# Patient Record
Sex: FEMALE | Race: WHITE | NOT HISPANIC OR LATINO | Employment: OTHER | RURAL
[De-identification: names, ages, dates, MRNs, and addresses within clinical notes are randomized per-mention and may not be internally consistent; named-entity substitution may affect disease eponyms.]

---

## 2017-02-18 ENCOUNTER — OFFICE VISIT (OUTPATIENT)
Dept: RETAIL CLINIC | Facility: CLINIC | Age: 32
End: 2017-02-18

## 2017-02-18 VITALS
HEART RATE: 85 BPM | BODY MASS INDEX: 30.92 KG/M2 | TEMPERATURE: 98.7 F | RESPIRATION RATE: 17 BRPM | WEIGHT: 216 LBS | OXYGEN SATURATION: 98 % | HEIGHT: 70 IN

## 2017-02-18 DIAGNOSIS — H02.9 EYELID ABNORMALITY: ICD-10-CM

## 2017-02-18 DIAGNOSIS — J06.9 UPPER RESPIRATORY TRACT INFECTION, UNSPECIFIED TYPE: Primary | ICD-10-CM

## 2017-02-18 PROCEDURE — 99213 OFFICE O/P EST LOW 20 MIN: CPT | Performed by: NURSE PRACTITIONER

## 2017-02-18 RX ORDER — ERYTHROMYCIN 5 MG/G
OINTMENT OPHTHALMIC 3 TIMES DAILY
Qty: 1 EACH | Refills: 0 | Status: SHIPPED | OUTPATIENT
Start: 2017-02-18

## 2017-02-18 RX ORDER — PSEUDOEPHEDRINE HCL 120 MG/1
120 TABLET, FILM COATED, EXTENDED RELEASE ORAL EVERY 12 HOURS
Qty: 60 TABLET | Refills: 0 | Status: SHIPPED | OUTPATIENT
Start: 2017-02-18 | End: 2017-03-20

## 2017-02-18 NOTE — PROGRESS NOTES
"Alex Mills is a 31 y.o. female.   Visit Vitals   • Pulse 85   • Temp 98.7 °F (37.1 °C)   • Resp 17   • Ht 70\" (177.8 cm)   • Wt 216 lb (98 kg)   • SpO2 98%   • BMI 30.99 kg/m2         URI    This is a new problem. The current episode started in the past 7 days. The problem has been gradually worsening (cold feels the same but lower eye lid swelling is slightly worse). Associated symptoms include congestion, headaches, rhinorrhea, sinus pain (slight) and sneezing. Pertinent negatives include no coughing, nausea, plugged ear sensation or sore throat.        The following portions of the patient's history were reviewed and updated as appropriate: allergies, current medications, past family history, past medical history, past social history, past surgical history and problem list.    Review of Systems   HENT: Positive for congestion, rhinorrhea and sneezing. Negative for sore throat.    Eyes: Positive for pain (slight external eye pain, no orbital deep pain) and discharge (slight crustiness on left side). Negative for photophobia, redness, itching and visual disturbance.   Respiratory: Negative for cough.    Gastrointestinal: Negative for nausea.   Musculoskeletal: Negative for arthralgias and myalgias.   Neurological: Positive for headaches.       Objective   Physical Exam   Constitutional: She appears well-developed and well-nourished. She does not have a sickly appearance. She does not appear ill.   HENT:   Head: Normocephalic and atraumatic.   Right Ear: Tympanic membrane and ear canal normal.   Left Ear: Tympanic membrane and ear canal normal.   Nose: Mucosal edema and rhinorrhea present. Right sinus exhibits no maxillary sinus tenderness and no frontal sinus tenderness. Left sinus exhibits no maxillary sinus tenderness and no frontal sinus tenderness.   Mouth/Throat: No posterior oropharyngeal edema. No tonsillar exudate.   Eyes: EOM are normal.       Cardiovascular: Regular rhythm and normal heart " sounds.    Pulmonary/Chest: Effort normal. She has no wheezes. She has no rhonchi. She has no rales.   Lymphadenopathy:     She has no cervical adenopathy.   Skin: Skin is warm and dry.       Assessment/Plan   Diagnoses and all orders for this visit:    Upper respiratory tract infection, unspecified type    Eyelid abnormality    Other orders  -     erythromycin (ROMYCIN) 5 MG/GM ophthalmic ointment; Administer  into the left eye 3 (Three) Times a Day.  -     pseudoephedrine (SUDAFED 12 HOUR) 120 MG 12 hr tablet; Take 1 tablet by mouth Every 12 (Twelve) Hours for 30 days.

## 2017-02-18 NOTE — PATIENT INSTRUCTIONS
Stye  A stye is a bump on your eyelid caused by a bacterial infection. A stye can form inside the eyelid (internal stye) or outside the eyelid (external stye). An internal stye may be caused by an infected oil-producing gland inside your eyelid. An external stye may be caused by an infection at the base of your eyelash (hair follicle).  Styes are very common. Anyone can get them at any age. They usually occur in just one eye, but you may have more than one in either eye.   CAUSES   The infection is almost always caused by bacteria called Staphylococcus aureus. This is a common type of bacteria that lives on your skin.  RISK FACTORS  You may be at higher risk for a stye if you have had one before. You may also be at higher risk if you have:  · Diabetes.  · Long-term illness.  · Long-term eye redness.  · A skin condition called seborrhea.  · High fat levels in your blood (lipids).  SIGNS AND SYMPTOMS   Eyelid pain is the most common symptom of a stye. Internal styes are more painful than external styes. Other signs and symptoms may include:  · Painful swelling of your eyelid.  · A scratchy feeling in your eye.  · Tearing and redness of your eye.  · Pus draining from the stye.  DIAGNOSIS   Your health care provider may be able to diagnose a stye just by examining your eye. The health care provider may also check to make sure:  · You do not have a fever or other signs of a more serious infection.  · The infection has not spread to other parts of your eye or areas around your eye.  TREATMENT   Most styes will clear up in a few days without treatment. In some cases, you may need to use antibiotic drops or ointment to prevent infection. Your health care provider may have to drain the stye surgically if your stye is:  · Large.  · Causing a lot of pain.  · Interfering with your vision.  This can be done using a thin blade or a needle.   HOME CARE INSTRUCTIONS   · Take medicines only as directed by your health care  "provider.  · Apply a clean, warm compress to your eye for 10 minutes, 4 times a day.  · Do not wear contact lenses or eye makeup until your stye has healed.  · Do not try to pop or drain the stye.  SEEK MEDICAL CARE IF:  · You have chills or a fever.  · Your stye does not go away after several days.  · Your stye affects your vision.  · Your eyeball becomes swollen, red, or painful.  MAKE SURE YOU:  · Understand these instructions.  · Will watch your condition.  · Will get help right away if you are not doing well or get worse.     This information is not intended to replace advice given to you by your health care provider. Make sure you discuss any questions you have with your health care provider.     Document Released: 09/27/2006 Document Revised: 01/08/2016 Document Reviewed: 04/03/2015  [employer] Interactive Patient Education ©2016 [employer]    Upper Respiratory Infection, Adult  Most upper respiratory infections (URIs) are a viral infection of the air passages leading to the lungs. A URI affects the nose, throat, and upper air passages. The most common type of URI is nasopharyngitis and is typically referred to as \"the common cold.\"  URIs run their course and usually go away on their own. Most of the time, a URI does not require medical attention, but sometimes a bacterial infection in the upper airways can follow a viral infection. This is called a secondary infection. Sinus and middle ear infections are common types of secondary upper respiratory infections.  Bacterial pneumonia can also complicate a URI. A URI can worsen asthma and chronic obstructive pulmonary disease (COPD). Sometimes, these complications can require emergency medical care and may be life threatening.   CAUSES  Almost all URIs are caused by viruses. A virus is a type of germ and can spread from one person to another.   RISKS FACTORS  You may be at risk for a URI if:   · You smoke.    · You have chronic heart or lung disease.  · You have " a weakened defense (immune) system.    · You are very young or very old.    · You have nasal allergies or asthma.  · You work in crowded or poorly ventilated areas.  · You work in health care facilities or schools.  SIGNS AND SYMPTOMS   Symptoms typically develop 2-3 days after you come in contact with a cold virus. Most viral URIs last 7-10 days. However, viral URIs from the influenza virus (flu virus) can last 14-18 days and are typically more severe. Symptoms may include:   · Runny or stuffy (congested) nose.    · Sneezing.    · Cough.    · Sore throat.    · Headache.    · Fatigue.    · Fever.    · Loss of appetite.    · Pain in your forehead, behind your eyes, and over your cheekbones (sinus pain).  · Muscle aches.    DIAGNOSIS   Your health care provider may diagnose a URI by:  · Physical exam.  · Tests to check that your symptoms are not due to another condition such as:  ¨ Strep throat.  ¨ Sinusitis.  ¨ Pneumonia.  ¨ Asthma.  TREATMENT   A URI goes away on its own with time. It cannot be cured with medicines, but medicines may be prescribed or recommended to relieve symptoms. Medicines may help:  · Reduce your fever.  · Reduce your cough.  · Relieve nasal congestion.  HOME CARE INSTRUCTIONS   · Take medicines only as directed by your health care provider.    · Gargle warm saltwater or take cough drops to comfort your throat as directed by your health care provider.  · Use a warm mist humidifier or inhale steam from a shower to increase air moisture. This may make it easier to breathe.  · Drink enough fluid to keep your urine clear or pale yellow.    · Eat soups and other clear broths and maintain good nutrition.    · Rest as needed.    · Return to work when your temperature has returned to normal or as your health care provider advises. You may need to stay home longer to avoid infecting others. You can also use a face mask and careful hand washing to prevent spread of the virus.  · Increase the usage of your  inhaler if you have asthma.    · Do not use any tobacco products, including cigarettes, chewing tobacco, or electronic cigarettes. If you need help quitting, ask your health care provider.  PREVENTION   The best way to protect yourself from getting a cold is to practice good hygiene.   · Avoid oral or hand contact with people with cold symptoms.    · Wash your hands often if contact occurs.    There is no clear evidence that vitamin C, vitamin E, echinacea, or exercise reduces the chance of developing a cold. However, it is always recommended to get plenty of rest, exercise, and practice good nutrition.   SEEK MEDICAL CARE IF:   · You are getting worse rather than better.    · Your symptoms are not controlled by medicine.    · You have chills.  · You have worsening shortness of breath.  · You have brown or red mucus.  · You have yellow or brown nasal discharge.  · You have pain in your face, especially when you bend forward.  · You have a fever.  · You have swollen neck glands.  · You have pain while swallowing.  · You have white areas in the back of your throat.  SEEK IMMEDIATE MEDICAL CARE IF:   · You have severe or persistent:    Headache.    Ear pain.    Sinus pain.    Chest pain.  · You have chronic lung disease and any of the following:    Wheezing.    Prolonged cough.    Coughing up blood.    A change in your usual mucus.  · You have a stiff neck.  · You have changes in your:    Vision.    Hearing.    Thinking.    Mood.  MAKE SURE YOU:   · Understand these instructions.  · Will watch your condition.  · Will get help right away if you are not doing well or get worse.     This information is not intended to replace advice given to you by your health care provider. Make sure you discuss any questions you have with your health care provider.     Document Released: 06/13/2002 Document Revised: 05/03/2016 Document Reviewed: 03/25/2015  [employer] Interactive Patient Education ©2016 [employer]

## 2019-07-01 ENCOUNTER — HOSPITAL ENCOUNTER (OUTPATIENT)
Age: 34
End: 2019-07-01
Payer: COMMERCIAL

## 2019-07-01 DIAGNOSIS — E66.9: Primary | ICD-10-CM

## 2019-07-01 LAB
ALBUMIN LEVEL: 3.7 GM/DL (ref 3.4–5)
ALBUMIN/GLOB SERPL: 1.2 {RATIO} (ref 1.1–1.8)
ALP ISO SERPL-ACNC: 78 U/L (ref 46–116)
ALT SERPLBLD-CCNC: 27 U/L (ref 12–78)
ANION GAP SERPL CALC-SCNC: 14.2 MEQ/L (ref 5–15)
AST SERPL QL: 13 U/L (ref 15–37)
BILIRUBIN,TOTAL: 0.4 MG/DL (ref 0.2–1)
BUN SERPL-MCNC: 17 MG/DL (ref 7–18)
CALCIUM SPEC-MCNC: 8.5 MG/DL (ref 8.5–10.1)
CHLORIDE SPEC-SCNC: 106 MMOL/L (ref 98–107)
CHOLEST SPEC-SCNC: 206 MG/DL (ref 140–200)
CO2 SERPL-SCNC: 28 MMOL/L (ref 21–32)
CREAT BLD-SCNC: 0.76 MG/DL (ref 0.55–1.02)
ESTIMATED GLOMERULAR FILT RATE: 87 ML/MIN (ref 60–?)
GFR (AFRICAN AMERICAN): 105 ML/MIN (ref 60–?)
GLOBULIN SER CALC-MCNC: 3 GM/DL (ref 1.3–3.2)
GLUCOSE: 81 MG/DL (ref 74–106)
HDLC SERPL-MCNC: 77 MG/DL (ref 29–89)
POTASSIUM: 4.2 MMOL/L (ref 3.5–5.1)
PROT SERPL-MCNC: 6.7 GM/DL (ref 6.4–8.2)
SODIUM SPEC-SCNC: 144 MMOL/L (ref 136–145)
TRIGLYCERIDES: 29 MG/DL (ref 30–200)
TSH SERPL-ACNC: 2.7 UIU/ML (ref 0.36–3.74)

## 2019-07-01 PROCEDURE — 80053 COMPREHEN METABOLIC PANEL: CPT

## 2019-07-01 PROCEDURE — 83525 ASSAY OF INSULIN: CPT

## 2019-07-01 PROCEDURE — 36415 COLL VENOUS BLD VENIPUNCTURE: CPT

## 2019-07-01 PROCEDURE — 82652 VIT D 1 25-DIHYDROXY: CPT

## 2019-07-01 PROCEDURE — 84443 ASSAY THYROID STIM HORMONE: CPT

## 2019-07-01 PROCEDURE — 80061 LIPID PANEL: CPT

## 2019-07-02 LAB — INSULIN SERPL-MCNC: 6.6 UIU/ML (ref 2.6–24.9)

## 2024-08-22 ENCOUNTER — HOSPITAL ENCOUNTER (EMERGENCY)
Age: 39
Discharge: HOME | End: 2024-08-22
Payer: COMMERCIAL

## 2024-08-22 VITALS
RESPIRATION RATE: 19 BRPM | TEMPERATURE: 98.24 F | OXYGEN SATURATION: 100 % | HEART RATE: 92 BPM | DIASTOLIC BLOOD PRESSURE: 99 MMHG | SYSTOLIC BLOOD PRESSURE: 169 MMHG

## 2024-08-22 VITALS
OXYGEN SATURATION: 97 % | DIASTOLIC BLOOD PRESSURE: 81 MMHG | RESPIRATION RATE: 20 BRPM | TEMPERATURE: 98.1 F | HEART RATE: 83 BPM | SYSTOLIC BLOOD PRESSURE: 139 MMHG

## 2024-08-22 VITALS — BODY MASS INDEX: 28.3 KG/M2

## 2024-08-22 DIAGNOSIS — R60.0: ICD-10-CM

## 2024-08-22 DIAGNOSIS — M79.662: Primary | ICD-10-CM

## 2024-08-22 LAB
ALBUMIN LEVEL: 4.4 G/DL (ref 3.5–5)
ALBUMIN/GLOB SERPL: 1.5 {RATIO} (ref 1.1–1.8)
ALP ISO SERPL-ACNC: 72 U/L (ref 38–126)
ALT SERPLBLD-CCNC: 20 U/L (ref 12–78)
ANION GAP SERPL CALC-SCNC: 7.9 MEQ/L (ref 5–15)
AST SERPL QL: 25 U/L (ref 14–36)
BILIRUBIN,TOTAL: 0.7 MG/DL (ref 0.2–1.3)
BUN SERPL-MCNC: 16 MG/DL (ref 7–17)
CALCIUM SPEC-MCNC: 8.5 MG/DL (ref 8.4–10.2)
CHLORIDE SPEC-SCNC: 106 MMOL/L (ref 98–107)
CK SERPL-CCNC: 54 U/L (ref 30–135)
CO2 SERPL-SCNC: 27 MMOL/L (ref 22–30)
CREAT BLD-SCNC: 0.8 MG/DL (ref 0.52–1.04)
CREATININE CLEARANCE ESTIMATED: 133 ML/MIN (ref 50–200)
D-DIMER: 0.51 UG/ML (ref 0–0.5)
ESTIMATED GLOMERULAR FILT RATE: 80 ML/MIN (ref 60–?)
GFR (AFRICAN AMERICAN): 97 ML/MIN (ref 60–?)
GLOBULIN SER CALC-MCNC: 2.9 G/DL (ref 1.3–3.2)
GLUCOSE: 87 MG/DL (ref 74–100)
HCT VFR BLD CALC: 41.6 % (ref 37–47)
HGB BLD-MCNC: 13.1 G/DL (ref 12.2–16.2)
INR PPP: 0.98 (ref 0.9–1.1)
MCHC RBC-ENTMCNC: 31.4 G/DL (ref 31.8–35.4)
MCV RBC: 100.3 FL (ref 81–99)
MEAN CORPUSCULAR HEMOGLOBIN: 31.5 PG (ref 27–31.2)
PLATELET # BLD: 281 K/MM3 (ref 142–424)
POTASSIUM: 3.9 MMOL/L (ref 3.5–5.1)
PROT SERPL-MCNC: 7.3 G/DL (ref 6.3–8.2)
PT BLD: 11 SECONDS (ref 10.1–12.5)
RBC # BLD AUTO: 4.15 M/MM3 (ref 4.2–5.4)
SODIUM SPEC-SCNC: 137 MMOL/L (ref 136–145)
TSH SERPL-ACNC: 1.74 UIU/ML (ref 0.47–4.68)
WBC # BLD AUTO: 6 K/MM3 (ref 4.8–10.8)

## 2024-08-22 PROCEDURE — 84703 CHORIONIC GONADOTROPIN ASSAY: CPT

## 2024-08-22 PROCEDURE — 99284 EMERGENCY DEPT VISIT MOD MDM: CPT

## 2024-08-22 PROCEDURE — 85025 COMPLETE CBC W/AUTO DIFF WBC: CPT

## 2024-08-22 PROCEDURE — 82550 ASSAY OF CK (CPK): CPT

## 2024-08-22 PROCEDURE — 80053 COMPREHEN METABOLIC PANEL: CPT

## 2024-08-22 PROCEDURE — 84443 ASSAY THYROID STIM HORMONE: CPT

## 2024-08-22 PROCEDURE — 80050 GENERAL HEALTH PANEL: CPT

## 2024-08-22 PROCEDURE — 85378 FIBRIN DEGRADE SEMIQUANT: CPT

## 2024-08-22 PROCEDURE — 73590 X-RAY EXAM OF LOWER LEG: CPT

## 2024-08-22 PROCEDURE — 85610 PROTHROMBIN TIME: CPT

## 2024-08-22 NOTE — [facility].[email or web address]
Discharge Plan
Disposition
Patient Disposition: Home, Self-Care

Referrals
Follow up/Referrals:
Provider,Referral, MD [Primary Care Provider] - See instructions

Activity Restrictions/Add. Instructions
Additional Instructions/Restrictions:
No definitive evidence of an acute emergent medical condition.  No evidence of an infection, clot, or secondary cause of your lower extremity edema such as cirrhosis significant protein loss heart failure etc.  Given the fact that your screening 
blood test for a blood clot was not less than the screening cutoff an outpatient DVT ultrasound has been ordered please follow-up with this and otherwise you may follow-up primary care doctor.  In the meantime you may use a compression stocking 
elevate your foot for improvement of your symptoms.

Clinical Impressions
Clinical Impression:
 Acute pain of left lower extremity, Edema of left lower extremity


Print Language
Print Language: English

Discharge
ED Provider: IZZY Chapman


General Adult HPI
General
Chief complaint: PAIN
Stated complaint: LT ankle swollen,painful no accident
Time Seen by Provider: 08/22/24 19:46
Mode of Arrival: Ambulatory
Source of Information: Patient
Limitations: No Limitations
Description of Symptoms (Recalled from ER Triage Doc. by RN): pt presents to ED with left ankle/foot pain. pt reports no known injury.
History of Present Illness
HPI narrative: 
38-year-old female presented today with nontraumatic left lower extremity pain and swelling over the last several days.  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  No prolonged immobilizations 
no hemoptysis no history of DVT or PE or other clotting disorders.  Denies any significant redness fevers chills etc.
Related Data
Allergies

Allergy/AdvReac Type Severity Reaction Status Date / Time
No Known Allergies Allergy   Verified 08/22/24 18:53



[facility]
Disclaimer: 
The information contained in this section may have been updated after the patient was seen, as this information can be updated by other users.

Social History
Smoking Status:  Never smoker 
alcohol intake:  never 
current occupational status:  other 
Travel in the last 8 weeks:  None 



ROS Obtained: Yes All systems reviewed & no additional complaints except as documented

Physical Exam
General
General appearance: alert
Respiratory
Respiratory exam: Present normal lung sounds bilaterally
Cardiovascular
Cardiovascular exam: Present regular rate
Extremities Exam
Extremities exam: Present other (Patient has bilateral lower extremity edema significantly worse on the left extending up to the proximal tib-fib area)
Neurological Exam
Neurological exam: Present alert and oriented X3

Medical Decision Making
Andreas Inquiry
Pt receiving controlled substance: No
Vital Signs: 



 08/22/24
18:50
Temperature 98.3 F
Temperature Source Oral
Pulse Rate [Left Radial] 92 H
Respiratory Rate 19
Blood Pressure [Right Arm] 169/99 H
Blood Pressure Mean [Right Arm] 122
02 Sat by Pulse Oximetry 100
Oxygen Delivery Method Room Air



Lab Data
Lab results reviewed: Yes I reviewed the patient's lab results.

Lab Results

08/22/24 20:13: WBC 6.0, RBC 4.15 L, Hgb 13.1, Hct 41.6, .3 H, MCH 31.5 H, MCHC 31.4 L, RDW 13.2, Plt Count 281, MPV 8.3, Neut % (Auto) 56.1, Lymph % (Auto) 34.0, Mono % (Auto) 5.9, Eos % (Auto) 2.8, Baso % (Auto) 1.1, Neut # (Auto) 3.4, 
Lymph # (Auto) 2.0, Mono # (Auto) 0.4, Eos # (Auto) 0.2, Baso # (Auto) 0.1, PT 11.0, INR 0.98, D-Dimer 0.51 H, Sodium 137, Potassium 3.9, Chloride 106, Carbon Dioxide 27, Anion Gap 7.9, BUN 16, Creatinine 0.80, Estimated Creat Clear 133, Estimated 
GFR 80, Est GFR (African Amer) 97, Glucose 87, Calcium 8.5, Total Bilirubin 0.7, AST 25, ALT 20, Alkaline Phosphatase 72, Total Creatine Kinase 54, Total Protein 7.3, Albumin 4.4, Serum HCG, Qual Negative




08/22/24 20:13          

08/22/24 20:13          

Orders (Tests/Meds): 

ORDERS

 Category Date Time Status
 POCUS  Point of Care (ER Only) Stat Exams  08/22/24 19:50 Ordered
 Tibia/fibula XR left 2 views [XR tibia fibula LT 2V] Exams  08/22/24 19:53 Completed
 Stat   
 CBC w/Auto Diff [Complete Blood Count Auto Diff] Stat Lab  08/22/24 20:13 Completed
 CK [Creatine Kinase] Stat Lab  08/22/24 20:13 Results
 CMP [Comprehensive Metabolic Panel] Stat Lab  08/22/24 20:13 Results
 D-Dimer Stat Lab  08/22/24 20:13 Completed
 HCG Qualitative, Serum Stat Lab  08/22/24 20:13 Completed
 PT INR [Prothrombin Time INR] Stat Lab  08/22/24 20:13 Completed
 TSH [Thyroid Stimulating Hormone] Stat Lab  08/22/24 20:13 Results
 UA [Urinalysis and Microscopic] Stat Lab  08/22/24 19:52 Ordered



Medical Decision Narrative: 
38-year-old with left lower extremity edema and pain from a nontraumatic fashion.  Differential includes protein-losing enteropathy, nephrotic syndrome, cirrhosis, heart failure, DVT, cellulitis, malignancy etc.  Will get plain films do a limited 
bedside ultrasound rule out DVT in addition to a D-dimer and basic blood work and reassess.

Reassessment 911 no significant warmth or erythema or range of motion tenderness not consistent with a septic joint or any type of gouty arthritis etc.  Labs unremarkable aside from mildly elevated D-dimer at 0.51.  This is just above my cutoff for 
low pretest probability to rule out a DVT.  Even in the setting of normal limited bedside ultrasound I have recommend that she get an outpatient comprehensive venous duplex ultrasound to further rule this out.  She is aware of this we have set this 
up for her for tomorrow.  I still have a very low pretest probability will not anticoagulate her at this point.  X-rays were performed to person interpreted shows no acute fracture dislocation or any other bony abnormalities.  There is some 
diagnostic uncertainty however emergencies are very unlikely at this point she has been advised follow the primary care doctor and to  return with any significant worsening symptoms.

Procedures
Miscellaneous Procedure
Procedure Performed: 
Structures identified
Common femoral veins and popliteal veins on the left 

Findings complete compression of left common femoral veins and left popliteal veins with normal augmentation bilaterally

Impression no evidence of left DVT

The study was performed by me and I personally interpreted all images and videos based on my clinical judgment these images were adequate and did not necessitate further imaging

Critical Care
Critical Care Time
Critical Care Time: No

## 2024-08-22 NOTE — XR_ITS
PROCEDURE INFORMATION:  
Exam: XR Left Tibia and Fibula  
Exam date and time: 8/22/2024 8:05 PM  
Age: 38 years old  
Clinical indication: Pain; Lower leg; Left; Additional info: Left  
lower  
extremity pain  
 
TECHNIQUE:  
Imaging protocol: Radiologic exam of the left tibia and fibula.  
Views: 2 views.  
Total images: 4  
 
COMPARISON:  
No relevant prior studies available.  
 
FINDINGS:  
Bones/joints: No acute fracture or joint dislocation. No concerning  
bone  
lesions or calcifications. Unremarkable joint spaces.  
Soft tissues: Unremarkable soft tissues.  
 
IMPRESSION:  
Negative left tibia and fibula.  
 
Electronically signed by Abdoulaye Salmeron MD at 08/22/2024 20:59

## 2024-08-22 NOTE — ED_ITS
Discharge Plan    
Disposition    
Patient Disposition: Home, Self-Care    
    
Referrals    
Follow up/Referrals:    
Provider,Referral, MD [Primary Care Provider] - See instructions    
    
Activity Restrictions/Add. Instructions    
Additional Instructions/Restrictions:    
No definitive evidence of an acute emergent medical condition.  No evidence of   
an infection, clot, or secondary cause of your lower extremity edema such as   
cirrhosis significant protein loss heart failure etc.  Given the fact that your   
screening blood test for a blood clot was not less than the screening cutoff an   
outpatient DVT ultrasound has been ordered please follow-up with this and   
otherwise you may follow-up primary care doctor.  In the meantime you may use a   
compression stocking elevate your foot for improvement of your symptoms.    
    
Clinical Impressions    
Clinical Impression:    
 Acute pain of left lower extremity, Edema of left lower extremity    
    
    
Print Language    
Print Language: English    
    
Discharge    
ED Provider: IZZY Chapman    
    
    
General Adult HPI    
General    
Chief complaint: PAIN    
Stated complaint: LT ankle swollen,painful no accident    
Time Seen by Provider: 08/22/24 19:46    
Mode of Arrival: Ambulatory    
Source of Information: Patient    
Limitations: No Limitations    
Description of Symptoms (Recalled from ER Triage Doc. by RN): pt presents to ED   
with left ankle/foot pain. pt reports no known injury.    
History of Present Illness    
HPI narrative:     
38-year-old female presented today with nontraumatic left lower extremity pain   
and swelling over the last several days.  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   
anything out of the ordinary last few days no definitive injuries.  No prolonged  
immobilizations no hemoptysis no history of DVT or PE or other clotting   
disorders.  Denies any significant redness fevers chills etc.    
Related Data    
                                    Allergies    
    
    
    
Allergy/AdvReac Type Severity Reaction Status Date / Time    
     
No Known Allergies Allergy   Verified 08/22/24 18:53    
    
    
    
    
[facility]    
Disclaimer:     
The information contained in this section may have been updated after the   
patient was seen, as this information can be updated by other users.    
    
Social History    
Smoking Status:  Never smoker     
alcohol intake:  never     
current occupational status:  other     
Travel in the last 8 weeks:  None     
    
    
    
ROS Obtained: Yes All systems reviewed & no additional complaints except as   
documented    
    
Physical Exam    
General    
General appearance: alert    
Respiratory    
Respiratory exam: Present normal lung sounds bilaterally    
Cardiovascular    
Cardiovascular exam: Present regular rate    
Extremities Exam    
Extremities exam: Present other (Patient has bilateral lower extremity edema   
significantly worse on the left extending up to the proximal tib-fib area)    
Neurological Exam    
Neurological exam: Present alert and oriented X3    
    
Medical Decision Making    
Andreas Inquiry    
Pt receiving controlled substance: No    
Vital Signs:     
    
                                            
    
    
    
 08/22/24    
18:50    
     
Temperature 98.3 F    
     
Temperature Source Oral    
     
Pulse Rate [Left Radial] 92 H    
     
Respiratory Rate 19    
     
Blood Pressure [Right Arm] 169/99 H    
     
Blood Pressure Mean [Right Arm] 122    
     
02 Sat by Pulse Oximetry 100    
     
Oxygen Delivery Method Room Air    
    
    
    
    
Lab Data    
Lab results reviewed: Yes I reviewed the patient's lab results.    
    
                                   Lab Results    
    
08/22/24 20:13: WBC 6.0, RBC 4.15 L, Hgb 13.1, Hct 41.6, .3 H, MCH 31.5 H  
, MCHC 31.4 L, RDW 13.2, Plt Count 281, MPV 8.3, Neut % (Auto) 56.1, Lymph %   
(Auto) 34.0, Mono % (Auto) 5.9, Eos % (Auto) 2.8, Baso % (Auto) 1.1, Neut #   
(Auto) 3.4, Lymph # (Auto) 2.0, Mono # (Auto) 0.4, Eos # (Auto) 0.2, Baso #   
(Auto) 0.1, PT 11.0, INR 0.98, D-Dimer 0.51 H, Sodium 137, Potassium 3.9,   
Chloride 106, Carbon Dioxide 27, Anion Gap 7.9, BUN 16, Creatinine 0.80,   
Estimated Creat Clear 133, Estimated GFR 80, Est GFR (African Amer) 97, Glucose   
87, Calcium 8.5, Total Bilirubin 0.7, AST 25, ALT 20, Alkaline Phosphatase 72,   
Total Creatine Kinase 54, Total Protein 7.3, Albumin 4.4, Serum HCG, Qual   
Negative    
    
    
    
    
                                                        08/22/24 20:13              
    
                                                        08/22/24 20:13              
    
Orders (Tests/Meds):     
    
                                     ORDERS    
    
    
    
 Category Date Time Status    
     
 POCUS  Point of Care (ER Only) Stat Exams  08/22/24 19:50 Ordered    
     
 Tibia/fibula XR left 2 views [XR tibia fibula LT 2V] Exams  08/22/24 19:53   
Completed    
    
 Stat       
     
 CBC w/Auto Diff [Complete Blood Count Auto Diff] Stat Lab  08/22/24 20:13   
Completed    
     
 CK [Creatine Kinase] Stat Lab  08/22/24 20:13 Results    
     
 CMP [Comprehensive Metabolic Panel] Stat Lab  08/22/24 20:13 Results    
     
 D-Dimer Stat Lab  08/22/24 20:13 Completed    
     
 HCG Qualitative, Serum Stat Lab  08/22/24 20:13 Completed    
     
 PT INR [Prothrombin Time INR] Stat Lab  08/22/24 20:13 Completed    
     
 TSH [Thyroid Stimulating Hormone] Stat Lab  08/22/24 20:13 Results    
     
 UA [Urinalysis and Microscopic] Stat Lab  08/22/24 19:52 Ordered    
    
    
    
    
Medical Decision Narrative:     
38-year-old with left lower extremity edema and pain from a nontraumatic   
fashion.  Differential includes protein-losing enteropathy, nephrotic syndrome,   
cirrhosis, heart failure, DVT, cellulitis, malignancy etc.  Will get plain films  
do a limited bedside ultrasound rule out DVT in addition to a D-dimer and basic   
blood work and reassess.    
    
Reassessment 911 no significant warmth or erythema or range of motion tenderness  
not consistent with a septic joint or any type of gouty arthritis etc.  Labs   
unremarkable aside from mildly elevated D-dimer at 0.51.  This is just above my   
cutoff for low pretest probability to rule out a DVT.  Even in the setting of   
normal limited bedside ultrasound I have recommend that she get an outpatient   
comprehensive venous duplex ultrasound to further rule this out.  She is aware   
of this we have set this up for her for tomorrow.  I still have a very low   
pretest probability will not anticoagulate her at this point.  X-rays were   
performed to person interpreted shows no acute fracture dislocation or any other  
bony abnormalities.  There is some diagnostic uncertainty however emergencies   
are very unlikely at this point she has been advised follow the primary care   
doctor and to  return with any significant worsening symptoms.    
    
Procedures    
Miscellaneous Procedure    
Procedure Performed:     
Structures identified    
Common femoral veins and popliteal veins on the left     
    
Findings complete compression of left common femoral veins and left popliteal   
veins with normal augmentation bilaterally    
    
Impression no evidence of left DVT    
    
The study was performed by me and I personally interpreted all images and videos  
based on my clinical judgment these images were adequate and did not necessitate  
further imaging    
    
Critical Care    
Critical Care Time    
Critical Care Time: No

## 2024-08-22 NOTE — PC.NURSE
Outpatient order given to Elizabeth in respiratory for Venous Doppler LE Left for r/o DVT. Results to go to patient PCP Felicity Hull at [facility]